# Patient Record
Sex: MALE | URBAN - METROPOLITAN AREA
[De-identification: names, ages, dates, MRNs, and addresses within clinical notes are randomized per-mention and may not be internally consistent; named-entity substitution may affect disease eponyms.]

---

## 2022-10-28 ENCOUNTER — TELEPHONE (OUTPATIENT)
Dept: PEDIATRICS | Facility: OTHER | Age: 62
End: 2022-10-28

## 2022-10-28 NOTE — TELEPHONE ENCOUNTER
Caller: Osei Wyatt    Relationship: Self    Best call back number: [phone]    What was the call regarding: PATIENT STATED THAT HE ASKED DR DYSON'S PREVIOUS OFFICE ON [facility] TO TRANSFER HIS RECORDS TO DR DYSON'S OFFICE NOW. PATIENT IS CALLING TO SEE IF THE OFFICE RECEIVED THOSE RECORDS. PLEASE ADVISE.    Do you require a callback: YES